# Patient Record
Sex: FEMALE | Race: WHITE | NOT HISPANIC OR LATINO | Employment: FULL TIME | URBAN - METROPOLITAN AREA
[De-identification: names, ages, dates, MRNs, and addresses within clinical notes are randomized per-mention and may not be internally consistent; named-entity substitution may affect disease eponyms.]

---
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
## 2024-01-29 ENCOUNTER — OFFICE VISIT (OUTPATIENT)
Dept: INTERNAL MEDICINE | Facility: CLINIC | Age: 19
End: 2024-01-29
Payer: COMMERCIAL

## 2024-01-29 VITALS
DIASTOLIC BLOOD PRESSURE: 68 MMHG | WEIGHT: 130.8 LBS | TEMPERATURE: 97.5 F | SYSTOLIC BLOOD PRESSURE: 102 MMHG | BODY MASS INDEX: 21.79 KG/M2 | HEIGHT: 65 IN | HEART RATE: 74 BPM | OXYGEN SATURATION: 99 %

## 2024-01-29 DIAGNOSIS — R63.1 INCREASED THIRST: ICD-10-CM

## 2024-01-29 DIAGNOSIS — R35.0 FREQUENT URINATION: ICD-10-CM

## 2024-01-29 DIAGNOSIS — Z13.1 SCREENING FOR DIABETES MELLITUS: ICD-10-CM

## 2024-01-29 DIAGNOSIS — F41.9 ANXIETY: Primary | ICD-10-CM

## 2024-01-29 LAB
EXPIRATION DATE: NORMAL
HBA1C MFR BLD: 5.5 % (ref 4.5–5.7)
Lab: NORMAL

## 2024-01-29 NOTE — PROGRESS NOTES
Chief Complaint  Anxiety    Subjective        Susy Cohen presents to [facility]  History of Present Illness  She was last seen in our office on November 29, 2023 for evaluation of anxiety and depression.  At that office visit, she reported having panic attacks, at least 3 times a week with heart palpitations and sweating, having outbursts where she hits her legs and screams and gets angry.  She had experienced anxiety depression for many years, having difficulty regulating her emotions.  At the time, she mentioned she had never been evaluated by a behavioral health provider.    She was started on sertraline (Zoloft) 25 mg p.o. daily and referred to behavioral health.    She has an appointment with Malka Garces, licensed care  for February 7, 2024.    She feels better today, taking sertaline (Zoloft) 25 mg daily. She feels she is not overly-thinking things again, living in the moment. She still has some frustration, which comes on easily, that she associates with being the parent of a toddler. But, she is able to calm down faster, better in control of her emotions. She will not have trouble going to bed, but wakes up in the middle of the night sometimes to urinate. Goes back to sleep easily after 5-10 minutes. She drinks a lot of water during the day, which is not a new issue. She does not remember if she was screened for diabetes as a child or adolescent. She thinks she was borderline gestational diabetic during her pregnancy. She drinks about 96 oz of water daily, still thirsty.     She has not had a pediatrician since living in [place], [place]. She did not  have routine care as a child/adolescent. Most recent routine medical care during her pregnancy in [place], [place]. She does have a women's health provider here in [place], [place]--MILA Mcbride.     Her great-grandmother (paternal side) and great uncle (paternal side) have Type 1 DM.       Objective  "  Vital Signs:  /68 (BP Location: Right arm, Patient Position: Sitting, Cuff Size: Adult)   Pulse 74   Temp 97.5 °F (36.4 °C) (Infrared)   Ht 165.1 cm (65\")   Wt 59.3 kg (130 lb 12.8 oz)   SpO2 99%   BMI 21.77 kg/m²   Estimated body mass index is 21.77 kg/m² as calculated from the following:    Height as of this encounter: 165.1 cm (65\").    Weight as of this encounter: 59.3 kg (130 lb 12.8 oz).  52 %ile (Z= 0.04) based on CDC (Girls, 2-20 Years) BMI-for-age based on BMI available as of 1/29/2024.    Pediatric BMI = 52 %ile (Z= 0.04) based on CDC (Girls, 2-20 Years) BMI-for-age based on BMI available as of 1/29/2024.. BMI is within normal parameters. No other follow-up for BMI required.      Physical Exam  Vitals reviewed.   Constitutional:       General: She is not in acute distress.     Appearance: Normal appearance. She is normal weight. She is not ill-appearing, toxic-appearing or diaphoretic.   Skin:     General: Skin is warm and dry.      Capillary Refill: Capillary refill takes less than 2 seconds.   Neurological:      General: No focal deficit present.      Mental Status: She is alert and oriented to person, place, and time. Mental status is at baseline.   Psychiatric:         Mood and Affect: Mood normal.         Behavior: Behavior normal.         Thought Content: Thought content normal.         Judgment: Judgment normal.        Result Review :                   Assessment and Plan   Patient is a very pleasant 18 year-old female with chronic diarrhea and chronic anxiety with anger issues here for follow up anxiety and report of increased thirst with frequent urination with nocturnal urination.         Diagnoses and all orders for this visit:    1. Anxiety (Primary)  Comments:  Keep scheduled appointment with behavior health provider 02/07/2024. Continue current dose sertraline (Zoloft) 25 mg.    2. Frequent urination    3. Increased thirst    4. Screening for diabetes " mellitus  Comments:  Hemoglobin A1c=5.5%.  Orders:  -     POC Glycosylated Hemoglobin (Hb A1C)    Much improved with daily sertraline (ZOLOFT) 25 mg PO QD. Continue this dose and regimen until after she speak with behavior health provider, per patient.   We will reassess her response to the medication and discuss goals for therapy at next office visit.     We will obtain other labwork at next office visit to determine what is causing her symptoms.          Follow Up   Return in about 1 month (around 2/29/2024) for Annual physical.  Patient was given instructions and counseling regarding her condition or for health maintenance advice. Please see specific information pulled into the AVS if appropriate.

## 2024-02-27 DIAGNOSIS — F41.9 ANXIETY: ICD-10-CM

## 2024-02-27 RX ORDER — SERTRALINE HYDROCHLORIDE 25 MG/1
25 TABLET, FILM COATED ORAL DAILY
Qty: 30 TABLET | Refills: 3 | Status: SHIPPED | OUTPATIENT
Start: 2024-02-27

## 2024-02-27 NOTE — TELEPHONE ENCOUNTER
Rx Refill Note  Requested Prescriptions     Pending Prescriptions Disp Refills    sertraline (ZOLOFT) 25 MG tablet [Pharmacy Med Name: SERTRALINE 25MG TABLETS] 30 tablet 3     Sig: TAKE 1 TABLET BY MOUTH DAILY      Last office visit with prescribing clinician: 1/29/2024   Last telemedicine visit with prescribing clinician: Visit date not found   Next office visit with prescribing clinician: Visit date not found                         Would you like a call back once the refill request has been completed: [] Yes [] No    If the office needs to give you a call back, can they leave a voicemail: [] Yes [] No    Lavonne Valenzuela MA  02/27/24, 08:46 EST

## 2024-03-19 ENCOUNTER — TELEPHONE (OUTPATIENT)
Dept: INTERNAL MEDICINE | Facility: CLINIC | Age: 19
End: 2024-03-19
Payer: COMMERCIAL

## 2024-03-19 NOTE — TELEPHONE ENCOUNTER
----- Message from Susy Cohen sent at 3/18/2024 11:54 AM EDT -----  Regarding: Appointment Request ()  Contact: [phone]  Appointment Request From: Susy Cohen    With Provider: Chio Diaz [-Primary Care Physician-]    Preferred Date Range: From 3/19/2024 To 4/3/2024    Preferred Times: Any    Reason: To address the following health maintenance concerns.  Hepatitis C Screening    Comments:  I would prefer anytime after 1:30 P.M.

## 2024-03-29 ENCOUNTER — OFFICE VISIT (OUTPATIENT)
Dept: INTERNAL MEDICINE | Facility: CLINIC | Age: 19
End: 2024-03-29
Payer: COMMERCIAL

## 2024-03-29 VITALS
TEMPERATURE: 95.9 F | OXYGEN SATURATION: 99 % | HEART RATE: 78 BPM | DIASTOLIC BLOOD PRESSURE: 56 MMHG | SYSTOLIC BLOOD PRESSURE: 106 MMHG

## 2024-03-29 DIAGNOSIS — F41.9 ANXIETY: ICD-10-CM

## 2024-03-29 DIAGNOSIS — Z86.2 HISTORY OF ANEMIA AS A CHILD: ICD-10-CM

## 2024-03-29 DIAGNOSIS — Z00.00 ANNUAL PHYSICAL EXAM: Primary | ICD-10-CM

## 2024-03-29 DIAGNOSIS — E55.9 VITAMIN D DEFICIENCY: ICD-10-CM

## 2024-03-29 DIAGNOSIS — Z83.3 FAMILY HISTORY OF TYPE 1 DIABETES MELLITUS: ICD-10-CM

## 2024-03-29 DIAGNOSIS — R53.83 OTHER FATIGUE: ICD-10-CM

## 2024-03-29 PROBLEM — K52.9 CHRONIC DIARRHEA: Status: RESOLVED | Noted: 2023-11-29 | Resolved: 2024-03-29

## 2024-03-29 PROCEDURE — 99395 PREV VISIT EST AGE 18-39: CPT | Performed by: NURSE PRACTITIONER

## 2024-03-29 RX ORDER — SERTRALINE HYDROCHLORIDE 25 MG/1
25 TABLET, FILM COATED ORAL DAILY
Qty: 90 TABLET | Refills: 3 | Status: SHIPPED | OUTPATIENT
Start: 2024-03-29

## 2024-03-29 NOTE — PROGRESS NOTES
"Chief Complaint  Annual Exam    Subjective        Susy Cohen presents to [facility]  History of Present Illness  Here for annual physical exam.     She was last seen in our office on January 29, 2024 for follow-up anxiety and depression.  At that office visit, she reporting doing well with sertraline (Zoloft) 25 mg p.o. daily and was scheduled to see Malka Garces, licensed care  for February 7, 2024.  She ended up having to cancel the appointment due to being in a different state.     Anxiety: she continues to take sertraline (Zoloft) 25 mg once daily. Doing well, continues to respond to this dose.     She has one child. She has been told by her mother that she has iron-deficiency anemia. No medical provider has ever told her she has iron-deficiency anemia.   She bruises easily, chronic and life-time. She gets fatigued easily, has SOA with weakness in her legs. Again, lifetime. She is not concerned about anything getting worse.     Does not drink alchol, works at [employer]. Tries to eat a balanced diet. She is sexually active and has IUD for birth control. She had it placed in 2022, will have replaced 2029.       Objective   Vital Signs:  /56   Pulse 78   Temp 95.9 °F (35.5 °C) (Infrared)   SpO2 99%   Estimated body mass index is 21.77 kg/m² as calculated from the following:    Height as of 1/29/24: 165.1 cm (65\").    Weight as of 1/29/24: 59.3 kg (130 lb 12.8 oz).  No height and weight on file for this encounter.    Pediatric BMI = No height and weight on file for this encounter.. BMI is within normal parameters. No other follow-up for BMI required.      Physical Exam  Vitals reviewed.   Constitutional:       General: She is not in acute distress.     Appearance: Normal appearance. She is normal weight. She is not ill-appearing or toxic-appearing.   HENT:      Head: Normocephalic and atraumatic.      Right Ear: Ear canal and external ear normal. " A middle ear effusion (mild and cloudy with few bubbles.) is present.      Left Ear: Ear canal and external ear normal. A middle ear effusion (mild and cloudy with few bubbles) is present.      Nose: Nose normal.      Mouth/Throat:      Mouth: Mucous membranes are moist.      Pharynx: Oropharynx is clear.   Eyes:      Conjunctiva/sclera: Conjunctivae normal.      Pupils: Pupils are equal, round, and reactive to light.   Cardiovascular:      Rate and Rhythm: Normal rate and regular rhythm.      Pulses: Normal pulses.      Heart sounds: Normal heart sounds.   Pulmonary:      Effort: Pulmonary effort is normal.      Breath sounds: Normal breath sounds.   Abdominal:      General: Abdomen is flat. Bowel sounds are normal.      Palpations: Abdomen is soft.      Tenderness: There is no abdominal tenderness.   Musculoskeletal:         General: Normal range of motion.      Right lower leg: No edema.      Left lower leg: No edema.   Skin:     General: Skin is warm.      Capillary Refill: Capillary refill takes less than 2 seconds.      Findings: Bruising (few, faint bruises bilateral legs.) present.   Neurological:      General: No focal deficit present.      Mental Status: She is alert and oriented to person, place, and time. Mental status is at baseline.      Motor: No weakness.      Coordination: Coordination normal.      Gait: Gait normal.      Deep Tendon Reflexes: Reflexes normal.   Psychiatric:         Mood and Affect: Mood normal.         Behavior: Behavior normal.         Thought Content: Thought content normal.         Judgment: Judgment normal.        Result Review :                   Assessment and Plan   Patient is a very pleasant 18-year-old female with anxiety, family history type 1 diabetes mellitus, history unspecified anemia as a child per mother's report here for annual physical.     We discussed preventative care including age and patient-appropriate immunizations and cancer screening. We also discussed  the importance of exercise and a healthy diet. This is their annual preventative exam.            Diagnoses and all orders for this visit:    1. Annual physical exam (Primary)  -     CBC & Differential  -     Comprehensive Metabolic Panel  -     Lipid Panel  -     TSH    2. Family history of type 1 diabetes mellitus  -     Hemoglobin A1c    3. Anxiety  -     sertraline (ZOLOFT) 25 MG tablet; Take 1 tablet by mouth Daily.  Dispense: 90 tablet; Refill: 3    4. History of anemia as a child  -     Iron Profile  -     Ferritin    5. Other fatigue  -     Vitamin B12  -     Cancel: Vitamin D,25-Hydroxy    6. Vitamin D deficiency  -     Vitamin D,25-Hydroxy      Earlier follow up pending results of labs.        Follow Up   Return in about 1 year (around 3/29/2025) for Annual physical.  Patient was given instructions and counseling regarding her condition or for health maintenance advice. Please see specific information pulled into the AVS if appropriate.

## 2024-03-30 LAB
25(OH)D3+25(OH)D2 SERPL-MCNC: 25.8 NG/ML (ref 30–100)
ALBUMIN SERPL-MCNC: 4.8 G/DL (ref 4–5)
ALBUMIN/GLOB SERPL: 2.1 {RATIO} (ref 1.2–2.2)
ALP SERPL-CCNC: 76 IU/L (ref 42–106)
ALT SERPL-CCNC: 15 IU/L (ref 0–32)
AST SERPL-CCNC: 21 IU/L (ref 0–40)
BASOPHILS # BLD AUTO: 0.1 X10E3/UL (ref 0–0.2)
BASOPHILS NFR BLD AUTO: 1 %
BILIRUB SERPL-MCNC: 0.3 MG/DL (ref 0–1.2)
BUN SERPL-MCNC: 8 MG/DL (ref 6–20)
BUN/CREAT SERPL: 13 (ref 9–23)
CALCIUM SERPL-MCNC: 9.7 MG/DL (ref 8.7–10.2)
CHLORIDE SERPL-SCNC: 106 MMOL/L (ref 96–106)
CHOLEST SERPL-MCNC: 136 MG/DL (ref 100–169)
CO2 SERPL-SCNC: 23 MMOL/L (ref 20–29)
CREAT SERPL-MCNC: 0.6 MG/DL (ref 0.57–1)
EGFRCR SERPLBLD CKD-EPI 2021: 133 ML/MIN/1.73
EOSINOPHIL # BLD AUTO: 0.6 X10E3/UL (ref 0–0.4)
EOSINOPHIL NFR BLD AUTO: 7 %
ERYTHROCYTE [DISTWIDTH] IN BLOOD BY AUTOMATED COUNT: 12.3 % (ref 11.7–15.4)
FERRITIN SERPL-MCNC: 80 NG/ML (ref 15–77)
GLOBULIN SER CALC-MCNC: 2.3 G/DL (ref 1.5–4.5)
GLUCOSE SERPL-MCNC: 79 MG/DL (ref 70–99)
HBA1C MFR BLD: 5.4 % (ref 4.8–5.6)
HCT VFR BLD AUTO: 40.5 % (ref 34–46.6)
HDLC SERPL-MCNC: 44 MG/DL
HGB BLD-MCNC: 13.6 G/DL (ref 11.1–15.9)
IMM GRANULOCYTES # BLD AUTO: 0 X10E3/UL (ref 0–0.1)
IMM GRANULOCYTES NFR BLD AUTO: 0 %
IRON SATN MFR SERPL: 28 % (ref 15–55)
IRON SERPL-MCNC: 87 UG/DL (ref 27–159)
LDLC SERPL CALC-MCNC: 81 MG/DL (ref 0–109)
LYMPHOCYTES # BLD AUTO: 3.6 X10E3/UL (ref 0.7–3.1)
LYMPHOCYTES NFR BLD AUTO: 41 %
MCH RBC QN AUTO: 29.6 PG (ref 26.6–33)
MCHC RBC AUTO-ENTMCNC: 33.6 G/DL (ref 31.5–35.7)
MCV RBC AUTO: 88 FL (ref 79–97)
MONOCYTES # BLD AUTO: 0.7 X10E3/UL (ref 0.1–0.9)
MONOCYTES NFR BLD AUTO: 8 %
NEUTROPHILS # BLD AUTO: 3.8 X10E3/UL (ref 1.4–7)
NEUTROPHILS NFR BLD AUTO: 43 %
PLATELET # BLD AUTO: 216 X10E3/UL (ref 150–450)
POTASSIUM SERPL-SCNC: 4.1 MMOL/L (ref 3.5–5.2)
PROT SERPL-MCNC: 7.1 G/DL (ref 6–8.5)
RBC # BLD AUTO: 4.59 X10E6/UL (ref 3.77–5.28)
SODIUM SERPL-SCNC: 141 MMOL/L (ref 134–144)
TIBC SERPL-MCNC: 311 UG/DL (ref 250–450)
TRIGL SERPL-MCNC: 51 MG/DL (ref 0–89)
TSH SERPL DL<=0.005 MIU/L-ACNC: 0.98 UIU/ML (ref 0.45–4.5)
UIBC SERPL-MCNC: 224 UG/DL (ref 131–425)
VIT B12 SERPL-MCNC: 603 PG/ML (ref 232–1245)
VLDLC SERPL CALC-MCNC: 11 MG/DL (ref 5–40)
WBC # BLD AUTO: 8.7 X10E3/UL (ref 3.4–10.8)

## 2024-10-16 VITALS
SYSTOLIC BLOOD PRESSURE: 117 MMHG | DIASTOLIC BLOOD PRESSURE: 81 MMHG | TEMPERATURE: 98.5 F | OXYGEN SATURATION: 99 % | BODY MASS INDEX: 22.49 KG/M2 | RESPIRATION RATE: 16 BRPM | HEIGHT: 65 IN | HEART RATE: 86 BPM | WEIGHT: 135 LBS

## 2024-10-16 PROCEDURE — 99211 OFF/OP EST MAY X REQ PHY/QHP: CPT

## 2024-10-17 ENCOUNTER — HOSPITAL ENCOUNTER (EMERGENCY)
Facility: HOSPITAL | Age: 19
Discharge: LEFT BEFORE DISCHARGE | End: 2024-10-17
Payer: COMMERCIAL

## 2025-03-24 ENCOUNTER — TELEPHONE (OUTPATIENT)
Dept: INTERNAL MEDICINE | Facility: CLINIC | Age: 20
End: 2025-03-24
Payer: COMMERCIAL

## 2025-03-24 NOTE — TELEPHONE ENCOUNTER
I called the patient to reschedule her appt on 3/31 with Chio. I was unable to leave a voicemail because it said the number was not in service. Chio is going to be out all next week for vacation.    Hub to relay

## 2025-06-28 ENCOUNTER — HOSPITAL ENCOUNTER (EMERGENCY)
Facility: HOSPITAL | Age: 20
Discharge: HOME | End: 2025-06-28
Attending: EMERGENCY MEDICINE
Payer: COMMERCIAL

## 2025-06-28 VITALS
HEART RATE: 67 BPM | TEMPERATURE: 97.9 F | DIASTOLIC BLOOD PRESSURE: 87 MMHG | WEIGHT: 120 LBS | SYSTOLIC BLOOD PRESSURE: 119 MMHG | BODY MASS INDEX: 20.49 KG/M2 | HEIGHT: 64 IN | OXYGEN SATURATION: 100 % | RESPIRATION RATE: 12 BRPM

## 2025-06-28 DIAGNOSIS — R10.84 GENERALIZED ABDOMINAL PAIN: Primary | ICD-10-CM

## 2025-06-28 LAB
B-HCG UR QL: NEGATIVE
BACTERIA UR QL AUTO: ABNORMAL /HPF
BILIRUB UR QL STRIP: NEGATIVE
CLARITY UR: CLEAR
CLUE CELLS SPEC QL WET PREP: ABNORMAL
COLOR UR: YELLOW
GLUCOSE UR STRIP-MCNC: NEGATIVE MG/DL
HGB UR QL STRIP.AUTO: ABNORMAL
HYALINE CASTS UR QL AUTO: ABNORMAL /LPF
HYDATID CYST SPEC WET PREP: ABNORMAL
KETONES UR QL STRIP: NEGATIVE
LEUKOCYTE ESTERASE UR QL STRIP.AUTO: NEGATIVE
NITRITE UR QL STRIP: NEGATIVE
PH UR STRIP.AUTO: 7 [PH] (ref 4.5–8)
PROT UR QL STRIP: NEGATIVE
RBC # UR STRIP: ABNORMAL /HPF
REF LAB TEST METHOD: ABNORMAL
SP GR UR STRIP: 1.02 (ref 1–1.03)
SQUAMOUS #/AREA URNS HPF: ABNORMAL /HPF
T VAGINALIS SPEC QL WET PREP: ABNORMAL
UROBILINOGEN UR QL STRIP: ABNORMAL
WBC # UR STRIP: ABNORMAL /HPF
WBC SPEC QL WET PREP: ABNORMAL
YEAST GENITAL QL WET PREP: ABNORMAL

## 2025-06-28 PROCEDURE — 99283 EMERGENCY DEPT VISIT LOW MDM: CPT | Performed by: EMERGENCY MEDICINE

## 2025-06-28 PROCEDURE — 87210 SMEAR WET MOUNT SALINE/INK: CPT | Performed by: EMERGENCY MEDICINE

## 2025-06-28 PROCEDURE — 81001 URINALYSIS AUTO W/SCOPE: CPT | Performed by: EMERGENCY MEDICINE

## 2025-06-28 PROCEDURE — 81025 URINE PREGNANCY TEST: CPT | Performed by: EMERGENCY MEDICINE

## 2025-06-29 NOTE — ED PROVIDER NOTES
Subjective   History of Present Illness  18 yo F with no relevant PMHx presents with 1-day h/o abdominal pain.  Pt states that she had vaginal intercourse tonight and then afterwards started to have some lower abdominal pain and rectal pressure.  Pt felt like she needed to have bowel movement but was unable to.  No fevers, no vomiting, no vaginal bleeding or discharge.  Pt notes she has IUD in place and is worried about it.        Review of Systems   Constitutional:  Negative for fever.   Cardiovascular:  Negative for chest pain.   Gastrointestinal:  Positive for abdominal pain and rectal pain. Negative for vomiting.   Genitourinary:  Negative for vaginal bleeding and vaginal discharge.       Past Medical History:   Diagnosis Date    Urinary incontinence     Visual impairment        No Known Allergies    Past Surgical History:   Procedure Laterality Date    EYE SURGERY      MYRINGOTOMY W/ TUBES Bilateral     NOSE SURGERY      TONSILLECTOMY         Family History   Problem Relation Age of Onset    Ovarian cysts Mother     Depression Mother     Ovarian cancer Maternal Grandmother     Breast cancer Other     Anxiety disorder Sister     Depression Sister     Depression Brother     Colon cancer Neg Hx     Uterine cancer Neg Hx        Social History     Socioeconomic History    Marital status: Single   Tobacco Use    Smoking status: Never     Passive exposure: Past    Smokeless tobacco: Current    Tobacco comments:     I smoke a vape   Vaping Use    Vaping status: Every Day    Substances: Nicotine, THC (occasionally)    Devices: Disposable   Substance and Sexual Activity    Alcohol use: Never    Drug use: Never    Sexual activity: Yes     Partners: Male     Birth control/protection: I.U.D.           Objective   Physical Exam  Vitals and nursing note reviewed.   Constitutional:       Comments: well-appearing   Pulmonary:      Effort: Pulmonary effort is normal. No respiratory distress.   Abdominal:      Tenderness: There  is no abdominal tenderness.   Genitourinary:     Cervix: Discharge present.      Comments: IUD strings visualized from cervical os.  Some mucous, some redness/irritation noted to cervical os        Procedures           ED Course                                                       Medical Decision Making  18 yo F with no relevant PMHx presents with 1-day h/o abdominal pain and rectal pain after vaginal intercourse tonight.  No other symptoms or complaints.  Examination with small amount of mucous and irritation at cervical os but otherwise unremarkable with no abdominal tenderness.  Will evaluate for acute process.    20:44 EDT  UA no uti.  Wet prep no yeast, no trich, no clue cells.  No other evidence appendicitis, sepsis, ectopic pregnancy, PID, other emergent medical condition clinically.  Pt doing ok, appears comfortable, seems stable for home care.  No indication for hospitalization or further emergent management in this context.  Discussed results and poc for dc home, symptom management, follow-up, return precautions.      Problems Addressed:  Generalized abdominal pain: acute illness or injury    Amount and/or Complexity of Data Reviewed  External Data Reviewed: labs and notes.  Labs: ordered.        Final diagnoses:   Generalized abdominal pain       ED Disposition  ED Disposition       ED Disposition   Discharge    Condition   Stable    Comment   --               your doctor          [facility] EMERGENCY DEPARTMENT  [address] [id number] [phone]    As needed         Medication List      No changes were made to your prescriptions during this visit.            Yosi Sanchez MD  06/28/25 [id number]

## 2025-06-29 NOTE — DISCHARGE INSTRUCTIONS
Your were evaluated for abdominal pain.  Your tests were ok.  Please continue to treat symptoms at home as needed.  You should follow up with your doctor.  Please return to the Emergency Department with any concerning symptoms.  Yosi Sanchez MD